# Patient Record
Sex: FEMALE | Race: WHITE
[De-identification: names, ages, dates, MRNs, and addresses within clinical notes are randomized per-mention and may not be internally consistent; named-entity substitution may affect disease eponyms.]

---

## 2020-09-02 ENCOUNTER — HOSPITAL ENCOUNTER (OUTPATIENT)
Dept: HOSPITAL 63 - PMG | Age: 40
Discharge: HOME | End: 2020-09-02
Attending: PHYSICIAN ASSISTANT
Payer: COMMERCIAL

## 2020-09-02 DIAGNOSIS — R14.3: ICD-10-CM

## 2020-09-02 DIAGNOSIS — R11.2: Primary | ICD-10-CM

## 2020-09-02 PROCEDURE — 74019 RADEX ABDOMEN 2 VIEWS: CPT

## 2020-09-02 NOTE — RAD
PROCEDURE: ABDOMEN SUPINE   UPRIGHT

 

CLINICAL INDICATION / HISTORY: Reason: ABDOMEN PAIN / Spl. Instructions:  

/ History: .

 

TECHNIQUE: Supine and upright AP images of the abdomen was obtained.

 

COMPARISON: None

 

FINDINGS: The lung bases are clear.  A nonobstructive bowel gas pattern is

present.  No organomegaly or pathologic calcifications are identified. No 

acute osseous abnormality.

 

IMPRESSION: No acute abdominal process. 

 

Electronically signed by: Breonna Cerna MD (9/2/2020 4:53 PM) 

[id number]

## 2021-09-10 ENCOUNTER — HOSPITAL ENCOUNTER (EMERGENCY)
Dept: HOSPITAL 63 - ER | Age: 41
Discharge: HOME | End: 2021-09-10
Payer: COMMERCIAL

## 2021-09-10 VITALS — DIASTOLIC BLOOD PRESSURE: 94 MMHG | SYSTOLIC BLOOD PRESSURE: 171 MMHG

## 2021-09-10 VITALS — HEIGHT: 67 IN | BODY MASS INDEX: 42.15 KG/M2 | WEIGHT: 268.52 LBS

## 2021-09-10 DIAGNOSIS — R51.9: ICD-10-CM

## 2021-09-10 DIAGNOSIS — R42: Primary | ICD-10-CM

## 2021-09-10 DIAGNOSIS — R20.0: ICD-10-CM

## 2021-09-10 LAB
ALBUMIN SERPL-MCNC: 3.8 G/DL (ref 3.4–5)
ALBUMIN/GLOB SERPL: 1 {RATIO} (ref 1–1.7)
ALP SERPL-CCNC: 84 U/L (ref 46–116)
ALT SERPL-CCNC: 31 U/L (ref 14–59)
ANION GAP SERPL CALC-SCNC: 10 MMOL/L (ref 6–14)
APTT PPP: YELLOW S
AST SERPL-CCNC: 20 U/L (ref 15–37)
BACTERIA #/AREA URNS HPF: 0 /HPF
BASOPHILS # BLD AUTO: 0.1 X10^3/UL (ref 0–0.2)
BASOPHILS NFR BLD: 1 % (ref 0–3)
BILIRUB SERPL-MCNC: 0.4 MG/DL (ref 0.2–1)
BILIRUB UR QL STRIP: (no result)
BUN/CREAT SERPL: 8 (ref 6–20)
CA-I SERPL ISE-MCNC: 5 MG/DL (ref 7–20)
CALCIUM SERPL-MCNC: 9.2 MG/DL (ref 8.5–10.1)
CHLORIDE SERPL-SCNC: 103 MMOL/L (ref 98–107)
CO2 SERPL-SCNC: 25 MMOL/L (ref 21–32)
CREAT SERPL-MCNC: 0.6 MG/DL (ref 0.6–1)
EOSINOPHIL NFR BLD: 0.2 X10^3/UL (ref 0–0.7)
EOSINOPHIL NFR BLD: 3 % (ref 0–3)
ERYTHROCYTE [DISTWIDTH] IN BLOOD BY AUTOMATED COUNT: 14.4 % (ref 11.5–14.5)
FIBRINOGEN PPP-MCNC: CLEAR MG/DL
GFR SERPLBLD BASED ON 1.73 SQ M-ARVRAT: 110.2 ML/MIN
GLOBULIN SER-MCNC: 3.9 G/DL (ref 2.2–3.8)
GLUCOSE SERPL-MCNC: 90 MG/DL (ref 70–99)
GLUCOSE UR STRIP-MCNC: (no result) MG/DL
HCT VFR BLD CALC: 44.8 % (ref 36–47)
HGB BLD-MCNC: 15.2 G/DL (ref 12–15.5)
LYMPHOCYTES # BLD: 3 X10^3/UL (ref 1–4.8)
LYMPHOCYTES NFR BLD AUTO: 37 % (ref 24–48)
MCH RBC QN AUTO: 31 PG (ref 25–35)
MCHC RBC AUTO-ENTMCNC: 34 G/DL (ref 31–37)
MCV RBC AUTO: 91 FL (ref 79–100)
MONO #: 0.4 X10^3/UL (ref 0–1.1)
MONOCYTES NFR BLD: 5 % (ref 0–9)
NEUT #: 4.4 X10^3UL (ref 1.8–7.7)
NEUTROPHILS NFR BLD AUTO: 54 % (ref 31–73)
NITRITE UR QL STRIP: (no result)
PLATELET # BLD AUTO: 264 X10^3/UL (ref 140–400)
POTASSIUM SERPL-SCNC: 4.2 MMOL/L (ref 3.5–5.1)
PROT SERPL-MCNC: 7.7 G/DL (ref 6.4–8.2)
RBC # BLD AUTO: 4.95 X10^6/UL (ref 3.5–5.4)
RBC #/AREA URNS HPF: (no result) /HPF (ref 0–2)
SODIUM SERPL-SCNC: 138 MMOL/L (ref 136–145)
SP GR UR STRIP: <=1.005
SQUAMOUS #/AREA URNS LPF: (no result) /LPF
UROBILINOGEN UR-MCNC: 0.2 MG/DL
WBC # BLD AUTO: 8.1 X10^3/UL (ref 4–11)
WBC #/AREA URNS HPF: (no result) /HPF (ref 0–4)

## 2021-09-10 PROCEDURE — 96374 THER/PROPH/DIAG INJ IV PUSH: CPT

## 2021-09-10 PROCEDURE — 96361 HYDRATE IV INFUSION ADD-ON: CPT

## 2021-09-10 PROCEDURE — 84484 ASSAY OF TROPONIN QUANT: CPT

## 2021-09-10 PROCEDURE — 36415 COLL VENOUS BLD VENIPUNCTURE: CPT

## 2021-09-10 PROCEDURE — 81001 URINALYSIS AUTO W/SCOPE: CPT

## 2021-09-10 PROCEDURE — 80053 COMPREHEN METABOLIC PANEL: CPT

## 2021-09-10 PROCEDURE — 93005 ELECTROCARDIOGRAM TRACING: CPT

## 2021-09-10 PROCEDURE — 82947 ASSAY GLUCOSE BLOOD QUANT: CPT

## 2021-09-10 PROCEDURE — 96375 TX/PRO/DX INJ NEW DRUG ADDON: CPT

## 2021-09-10 PROCEDURE — 70450 CT HEAD/BRAIN W/O DYE: CPT

## 2021-09-10 PROCEDURE — 85025 COMPLETE CBC W/AUTO DIFF WBC: CPT

## 2021-09-10 PROCEDURE — 99285 EMERGENCY DEPT VISIT HI MDM: CPT

## 2021-09-10 NOTE — RAD
INDICATION: Reason: lightheaded, numbness / Spl. Instructions:  / History: 



COMPARISON: None.



TECHNIQUE: 



Axial CT images obtained through the head without intravenous contrast.



One or more of the following individualized dose reduction techniques were utilized for this examinat
ion:  1. Automated exposure control;  2. Adjustment of the mA and/or kV according to patient size;  3
. Use of iterative reconstruction technique.



FINDINGS:



No intracranial hemorrhage.

No significant  midline shift. 

Ventricles and sulci are unremarkable.

No acute osseous abnormality.



IMPRESSION:



*   No acute intracranial hemorrhage.



Electronically signed by: Osiel Hernandes MD (9/10/2021 2:16 PM) [id number]

## 2021-09-10 NOTE — PHYS DOC
Past History


Additional Past Medical Histor:  vertigo


Past Surgical History:  Other


Additional Past Surgical Histo:  leg surgery


Alcohol Use:  Rarely





General Adult


EDM:


Chief Complaint:  MULTIPLE COMPLAINTS





HPI:


HPI:


Patient is a 40-year-old female being seen in the ER for dizziness and 

intermittent facial numbness x3 weeks.  Patient states she has a history of 

vertigo and she takes 25 mg of meclizine 3 times a day but this is not improving

her symptoms.  Patient is also reporting a generalized headache.  She denies 

nausea, vomiting, vision changes.  She rates her head pain 4 out of 10.  

Patient's blood pressure was elevated in the ER.  She does not have a history of

hypertension.





Review of Systems:


Review of Systems:


14 body systems of the review of systems have been reviewed.  See HPI for 

pertinent positive and negative responses, otherwise all other systems are 

negative, nonpertinent or noncontributory





Current Medications:


Current Meds:





Current Medications








 Medications


  (Trade)  Dose


 Ordered  Sig/Jared  Start Time


 Stop Time Status Last Admin


Dose Admin


 


 Diphenhydramine


 HCl


  (Benadryl)  25 mg  1X  ONCE  9/10/21 14:15


 9/10/21 14:16 DC  





 


 Ketorolac


 Tromethamine


  (Toradol 15mg


 Vial)  15 mg  1X  ONCE  9/10/21 14:15


 9/10/21 14:16 DC  





 


 Prochlorperazine


 Edisylate


  (Compazine)  10 mg  1X  ONCE  9/10/21 14:15


 9/10/21 14:16 DC  





 


 Sodium Chloride  1,000 ml @ 


 1,000 mls/hr  1X  ONCE  9/10/21 14:15


 9/10/21 15:14   














Allergies:


Allergies:





Allergies








Coded Allergies Type Severity Reaction Last Updated Verified


 


  No Known Drug Allergies    9/10/21 No











Physical Exam:


PE:





Constitutional: Well developed, well nourished, no acute distress, non-toxic 

appearance. []


HENT: Normocephalic, atraumatic, bilateral external ears normal, oropharynx 

moist, no oral exudates, nose normal. []


Eyes: PERRLA, EOMI, 4 mm pupils bilaterally conjunctiva normal, no dizziness 

noted with extraocular eye movements, no discharge. [] 


Neck: Normal range of motion, no stridor


Cardiovascular:Heart rate regular rhythm, no murmur []


Lungs & Thorax:  Bilateral breath sounds clear to auscultation []


Abdomen: Bowel sounds normal, soft, no tenderness, no masses, no pulsatile 

masses. [] 


Skin: Warm, dry, no erythema, no rash. [] 


Back: Normal range of motion


Extremities: No tenderness, no cyanosis, no clubbing, ROM intact, no edema. [] 


Neurologic: Alert and oriented X 3, normal motor function, normal sensory 

function, no focal deficits noted, patient able to move all 4 extremities 

equally, equal strengths, no pronator drift, no slurred speech or speech 

changes, dizziness with horizontal head movements, sensation equally to both 

sides of face, sensation intact.. []


Psychologic: Affect normal, judgement normal, mood normal. []





Current Patient Data:


Labs:





Laboratory Tests








Test


 9/10/21


14:21 9/10/21


14:25


 


Glucose (Fingerstick) 90 mg/dL  


 


White Blood Count  8.1 x10^3/uL 


 


Red Blood Count  4.95 x10^6/uL 


 


Hemoglobin  15.2 g/dL 


 


Hematocrit  44.8 % 


 


Mean Corpuscular Volume  91 fL 


 


Mean Corpuscular Hemoglobin  31 pg 


 


Mean Corpuscular Hemoglobin


Concent 


 34 g/dL 





 


Red Cell Distribution Width  14.4 % 


 


Platelet Count  264 x10^3/uL 


 


Neutrophils (%) (Auto)  54 % 


 


Lymphocytes (%) (Auto)  37 % 


 


Monocytes (%) (Auto)  5 % 


 


Eosinophils (%) (Auto)  3 % 


 


Basophils (%) (Auto)  1 % 


 


Neutrophils # (Auto)  4.4 x10^3uL 


 


Lymphocytes # (Auto)  3.0 x10^3/uL 


 


Monocytes # (Auto)  0.4 x10^3/uL 


 


Eosinophils # (Auto)  0.2 x10^3/uL 


 


Basophils # (Auto)  0.1 x10^3/uL 


 


Sodium Level  138 mmol/L 


 


Potassium Level  4.2 mmol/L 


 


Chloride Level  103 mmol/L 


 


Carbon Dioxide Level  25 mmol/L 


 


Anion Gap  10 


 


Blood Urea Nitrogen  5 mg/dL 


 


Creatinine  0.6 mg/dL 


 


Estimated GFR


(Cockcroft-Gault) 


 110.2 





 


BUN/Creatinine Ratio  8 


 


Glucose Level  90 mg/dL 


 


Calcium Level  9.2 mg/dL 


 


Total Bilirubin  0.4 mg/dL 


 


Aspartate Amino Transf


(AST/SGOT) 


 20 U/L 





 


Alanine Aminotransferase


(ALT/SGPT) 


 31 U/L 





 


Alkaline Phosphatase  84 U/L 


 


Troponin I Quantitative  0.020 ng/mL 


 


Total Protein  7.7 g/dL 


 


Albumin  3.8 g/dL 


 


Albumin/Globulin Ratio  1.0 








Current Medications








 Medications


  (Trade)  Dose


 Ordered  Sig/Jared


 Route


 PRN Reason  Start Time


 Stop Time Status Last Admin


Dose Admin


 


 Sodium Chloride  1,000 ml @ 


 1,000 mls/hr  1X  ONCE


 IV


   9/10/21 14:15


 9/10/21 15:14 DC 9/10/21 14:23





 


 Diphenhydramine


 HCl


  (Benadryl)  25 mg  1X  ONCE


 IVP


   9/10/21 14:15


 9/10/21 14:16 DC 9/10/21 14:28





 


 Prochlorperazine


 Edisylate


  (Compazine)  10 mg  1X  ONCE


 IV


   9/10/21 14:15


 9/10/21 14:16 DC 9/10/21 14:24





 


 Ketorolac


 Tromethamine


  (Toradol 15mg


 Vial)  15 mg  1X  ONCE


 IVP


   9/10/21 14:15


 9/10/21 14:16 DC 9/10/21 14:30











Vital Signs:





                                   Vital Signs








  Date Time  Temp Pulse Resp B/P (MAP) Pulse Ox O2 Delivery O2 Flow Rate FiO2


 


9/10/21 13:10 98.9 90 20 218/107 (144) 97 Room Air  











EKG:


EKG:


EKG performed by ER staff at 1448 shows sinus rhythm, no STEMI read by Dr. Avelar.  []





Radiology/Procedures:


Radiology/Procedures:


PROCEDURE: CT HEAD WO CONTRAST








INDICATION: Reason: lightheaded, numbness / Spl. Instructions:  / History: 





COMPARISON: None.





TECHNIQUE: 





Axial CT images obtained through the head without intravenous contrast.





One or more of the following individualized dose reduction techniques were 

utilized for this examination:  1. Automated exposure control;  2. Adjustment of

 the mA and/or kV according to patient size;  3. Use of iterative reconstruction

 technique.





FINDINGS:





No intracranial hemorrhage.


No significant  midline shift. 


Ventricles and sulci are unremarkable.


No acute osseous abnormality.





IMPRESSION:





*   No acute intracranial hemorrhage.





Electronically signed by: Phoenix Orta MD (9/10/2021 2:16 PM) [id number]














DICTATED AND SIGNED BY:     PHOENIX ORTA MD


DATE:     09/10/21 1411





CC: TALITA ALFARO PA; SHAILESH COVINGTON APRN ~MTH0 0


[]





Heart Score:


C/O Chest Pain:  No


Risk Factors:


Risk Factors:  DM, Current or recent (<one month) smoker, HTN, HLP, family 

history of CAD, obesity.


Risk Scores:


Score 0 - 3:  2.5% MACE over next 6 weeks - Discharge Home


Score 4 - 6:  20.3% MACE over next 6 weeks - Admit for Clinical Observation


Score 7 - 10:  72.7% MACE over next 6 weeks - Early Invasive Strategies





Course & Med Decision Making:


Course & Med Decision Making


Pertinent Labs and Imaging studies reviewed. (See chart for details)





Patient is a 40-year-old female being seen in the ER for dizziness and 

intermittent facial numbness with a generalized headache for 3 weeks.  Patient 

has a positive history of vertigo but states her meclizine is not helping her 

symptoms.  Work-up in the ER consisted of blood work, CT scan of head, EKG, 

urinalysis.  Patient's headache was treated with Toradol she was given Benadryl 

and Compazine for her vertigo symptoms.  Negative hints exam.  Following 

treatment in the ER she reports that her dizziness and her headache is 

improving.  Upon reevaluation, patient reports that her symptoms have improved. 

 Her blood pressure has decreased and is now 171/94.  Patient advised to follow-

up with her primary care provider regarding her elevated blood pressure readings

 in the ER.  Told her she may have to keep a log of her blood pressures to bring

 to her primary care provider's office.  She states that she follows up with Dr. Santos for neurology but has not seen her in a while.  Follow-up information was 

given for Dr. Saravia.  I discussed with patient all findings and diagnostic 

testing as well as the need to follow-up with PCP for further evaluation and 

treatment or return to the ER if any new or worsening symptoms.  Strict return 

precautions were also discussed at length.  Patient voiced understanding and 

agreement with the plan.  Patient is hemodynamically stable at the time of 

disposition.





Dragon Disclaimer:


Dragon Disclaimer:


This electronic medical record was generated, in whole or in part, using a voice

 recognition dictation system.





Departure


Departure:


Impression:  


   Primary Impression:  


   Vertigo


Disposition:  01 HOME / SELF CARE / HOMELESS


Condition:  GOOD


Referrals:  


TALITA ALFARO (PCP)








STEPHAN SARAVIA MD


Patient Instructions:  Vertigo





Additional Instructions:  


You were seen in the ER today for dizziness.  Your work-up in the ER was 

unremarkable.  Your CT scan of your head was negative for any acute findings.  

It is likely that you are suffering from vertigo.  Continue taking meclizine as 

directed.  Increase fluids and rest.  You will need to follow-up with your [facility] care provider on Monday regarding your ER visit.  As we discussed, your 

blood pressure readings in the ER were elevated.  You may need to keep a log of 

these blood pressures at home to bring to your primary care provider's office to

 determine if you need to be placed on antihypertensive medications.  You stated

 that you follow-up with Dr. Baker has not seen her in a long period of time.  

You can follow-up with Dr. Cadena BP is a neurologist for this facility.  You can 

call his office to set up a follow-up appointment at .  If you 

develop worsening of your dizziness, syncope, vision changes, weakness to 1 side

 of your body, intractable nausea or vomiting, chest pain, shortness of breath 

severe headache please return to the ER.





EMERGENCY DEPARTMENT GENERAL DISCHARGE INSTRUCTIONS





Thank you for coming to [facility] (ED) today and trusting us

 with you 


care.  We trust that you had a positivie experience in our Emergency Department.

  If you 


wish to speak to the department management, you may call the director at 

[phone].





YOUR FOLLOW UP INSTRUCTIONS ARE AS FOLLOWS:





1.  Do you have a private Doctor?  If you do not have a private doctor, please 

ask for a 


resource list of physicians or clinics that may be able to assist you with 

follow up care.





2.  The Emergency Physician has interpreted your x-rays.  The X-Ray specialist 

will also 


review them.  If there is a change in the findings, you will be notified in 48 

hours when at 


all possible.





3.  A lab test or culture has been done, your results will be reviewed and you 

will be 


notified if you need a change in treatment.





ADDITIONAL INSTRUCTIONS AND INFORMATION:





1.  Your care today has been supervised by a physician who is specially trained 

in emergency 


care.  Many problems require more than one evaluation for a complete diagnosis 

and 


treatment.  We recommend that you schedule your follow up appointment as 

recommended to 


ensure complete treatment of you illness or injury.  If you are unable to obtain

 follow up 


care and continue to have a problem, or if your condition worsens, we recommend 

that you 


return to the ED.





2.  We are not able to safely determine your condition over the phone nor are we

 able to 


give sound medical advice over the phone.  For these safety reasons, if you call

 for medical 


advice we will ask you to come to the ED for further evaluation.





3.  If you have any questions regarding these discharge instructions please call

 the ED at 


[phone].





SAFETY INFORMATION:





In the interest of safety, wellness, and injury prevention; we encourage you to 

wear your 


sealbelt, if you smoke; quite smoking, and we encourage family to use a prot

ective helmet 


for bicycling and other sporting events that present an increased risk for head 

injury.





IF YOUR SYMPTOMS WORSEN OR NEW SYMPTOMS DEVELOP, OR YOU HAVE CONCERNS ABOUT YOUR

 CONDITION; 


OR IF YOUR CONDITION WORSENS WHILE YOU ARE WAITING FOR YOUR FOLLOW UP 

APPOINTMENT; EITHER 


CONTACT YOUR PRIMARY CARE DOCTOR, THE PHYSICIAN WHOSE NAME AND NUMBER YOU WERE 

GIVEN, OR 


RETURN TO THE ED IMMEDIATELY.











SHAILESH COVINGTON APRN          Sep 10, 2021 14:19

## 2021-09-10 NOTE — EKG
[facility] [address], [place] [address]

Test Date:    2021-09-10               Test Time:    14:48:46

Pat Name:     KATARINA ONOFRE           Department:   

Patient ID:   [id number]           Room:          

Gender:       F                        Technician:   NABEEL

:          1980               Requested By: SHAILESH COVINGTON

Order Number: [id number]            Reading MD:     

                                 Measurements

Intervals                              Axis          

Rate:         73                       P:            55

IA:           142                      QRS:          67

QRSD:         80                       T:            19

QT:           422                                    

QTc:          469                                    

                           Interpretive Statements

SINUS RHYTHM

NORMAL ECG

RI6.02

No previous ECG available for comparison